# Patient Record
Sex: MALE
[De-identification: names, ages, dates, MRNs, and addresses within clinical notes are randomized per-mention and may not be internally consistent; named-entity substitution may affect disease eponyms.]

---

## 2019-05-21 ENCOUNTER — APPOINTMENT (OUTPATIENT)
Dept: OTOLARYNGOLOGY | Facility: CLINIC | Age: 79
End: 2019-05-21
Payer: SELF-PAY

## 2019-05-21 VITALS — BODY MASS INDEX: 26.17 KG/M2 | HEIGHT: 69.69 IN | WEIGHT: 180.78 LBS

## 2019-05-21 PROBLEM — Z00.00 ENCOUNTER FOR PREVENTIVE HEALTH EXAMINATION: Status: ACTIVE | Noted: 2019-05-21

## 2019-05-21 PROCEDURE — 99203 OFFICE O/P NEW LOW 30 MIN: CPT

## 2019-05-21 RX ORDER — METHYLPREDNISOLONE 4 MG/1
4 TABLET ORAL
Qty: 1 | Refills: 0 | Status: ACTIVE | COMMUNITY
Start: 2019-05-21 | End: 1900-01-01

## 2019-05-21 NOTE — REASON FOR VISIT
[Initial Evaluation] : an initial evaluation for [Family Member] : family member [FreeTextEntry2] : right ear congestion and clogged sensation for the past 3 days.  Patient states his level of severity is a level 8 out of 20 and it occurs constant.  Patient states nothing helps to improve or worsens his right ear congestion and clogged sensation for the past 3 days.

## 2019-05-21 NOTE — PROCEDURE
[Image(s) Captured] : image(s) captured and filed [Topical Lidocaine] : topical lidocaine [Flexible Endoscope] : examined with the flexible endoscope [Serial Number: ___] : Serial Number: [unfilled] [de-identified] :  Hemotympanum of the right ear.  Otitis Media of the right ear

## 2019-05-21 NOTE — PHYSICAL EXAM
[de-identified] : Hemotympanum of the right ear.  Otitis Media of the right ear  [] : septum deviated bilaterally [de-identified] : Hemotympanum of the right ear.  Otitis Media of the right ear  [Normal] : mucosa is normal [Midline] : trachea located in midline position

## 2019-05-21 NOTE — HISTORY OF PRESENT ILLNESS
[de-identified] : 78 years old male patient with history of right ear congestion and clogged sensation for the past 3 days.   Patient is present today in the office with Hemotympanum of the right ear.  Otitis Media of the right ear

## 2019-05-21 NOTE — REVIEW OF SYSTEMS
[Patient Intake Form Reviewed] : Patient intake form was reviewed [Ear Pain] : ear pain [Hearing Loss] : hearing loss [Shortness Of Breath] : shortness of breath [Wheezing] : wheezing [Cough] : cough [As Noted in HPI] : as noted in HPI [Anxiety] : anxiety [Depression] : depression [Negative] : Heme/Lymph [FreeTextEntry4] : Neck pain

## 2019-05-28 ENCOUNTER — APPOINTMENT (OUTPATIENT)
Dept: OTOLARYNGOLOGY | Facility: CLINIC | Age: 79
End: 2019-05-28
Payer: SELF-PAY

## 2019-05-28 VITALS
TEMPERATURE: 97.8 F | OXYGEN SATURATION: 98 % | SYSTOLIC BLOOD PRESSURE: 107 MMHG | DIASTOLIC BLOOD PRESSURE: 67 MMHG | RESPIRATION RATE: 17 BRPM | HEART RATE: 62 BPM

## 2019-05-28 DIAGNOSIS — H66.91 OTITIS MEDIA, UNSPECIFIED, RIGHT EAR: ICD-10-CM

## 2019-05-28 DIAGNOSIS — Z87.898 PERSONAL HISTORY OF OTHER SPECIFIED CONDITIONS: ICD-10-CM

## 2019-05-28 DIAGNOSIS — Z87.891 PERSONAL HISTORY OF NICOTINE DEPENDENCE: ICD-10-CM

## 2019-05-28 DIAGNOSIS — H74.8X1 OTHER SPECIFIED DISORDERS OF RIGHT MIDDLE EAR AND MASTOID: ICD-10-CM

## 2019-05-28 DIAGNOSIS — Z87.09 PERSONAL HISTORY OF OTHER DISEASES OF THE RESPIRATORY SYSTEM: ICD-10-CM

## 2019-05-28 DIAGNOSIS — Z85.46 PERSONAL HISTORY OF MALIGNANT NEOPLASM OF PROSTATE: ICD-10-CM

## 2019-05-28 PROCEDURE — 99213 OFFICE O/P EST LOW 20 MIN: CPT

## 2019-05-28 NOTE — REASON FOR VISIT
[Subsequent Evaluation] : a subsequent evaluation for [Family Member] : family member [FreeTextEntry2] : persistent right ear  Hemotympanum.

## 2019-05-28 NOTE — PHYSICAL EXAM
[] : septum deviated bilaterally [Midline] : trachea located in midline position [Normal] : no rashes [de-identified] : Hemotympanum of the right ear.  Otitis Media of the right ear  [de-identified] : Hemotympanum of the right ear.  Otitis Media of the right ear

## 2019-05-28 NOTE — HISTORY OF PRESENT ILLNESS
[de-identified] : 78 years old male patient with history of right ear congestion and clogged sensation for the past 3 days.   Patient is present today in the office with persistent right ear Hemotympanum.   Otitis Media of the right ear

## 2019-05-28 NOTE — REVIEW OF SYSTEMS
[Patient Intake Form Reviewed] : Patient intake form was reviewed [Ear Pain] : ear pain [Hearing Loss] : hearing loss [Shortness Of Breath] : shortness of breath [Cough] : cough [Wheezing] : wheezing [As Noted in HPI] : as noted in HPI [Anxiety] : anxiety [Depression] : depression [Negative] : Heme/Lymph [FreeTextEntry4] : Neck pain

## 2019-05-31 RX ORDER — ASPIRIN 325 MG/1
TABLET, FILM COATED ORAL
Refills: 0 | Status: ACTIVE | COMMUNITY

## 2019-05-31 RX ORDER — AMOXICILLIN AND CLAVULANATE POTASSIUM 875; 125 MG/1; MG/1
875-125 TABLET, COATED ORAL
Qty: 14 | Refills: 0 | Status: COMPLETED | COMMUNITY
Start: 2019-05-21 | End: 2019-05-31

## 2020-12-21 PROBLEM — H66.91 OTITIS MEDIA, RIGHT: Status: RESOLVED | Noted: 2019-05-21 | Resolved: 2020-12-21
